# Patient Record
Sex: FEMALE | Race: WHITE
[De-identification: names, ages, dates, MRNs, and addresses within clinical notes are randomized per-mention and may not be internally consistent; named-entity substitution may affect disease eponyms.]

---

## 2017-10-09 ENCOUNTER — HOSPITAL ENCOUNTER (EMERGENCY)
Dept: HOSPITAL 11 - JP.ED | Age: 57
Discharge: HOME | End: 2017-10-09
Payer: MEDICAID

## 2017-10-09 VITALS — DIASTOLIC BLOOD PRESSURE: 83 MMHG | SYSTOLIC BLOOD PRESSURE: 149 MMHG

## 2017-10-09 DIAGNOSIS — E78.00: ICD-10-CM

## 2017-10-09 DIAGNOSIS — K52.9: Primary | ICD-10-CM

## 2017-10-09 DIAGNOSIS — Z90.49: ICD-10-CM

## 2017-10-09 DIAGNOSIS — F32.9: ICD-10-CM

## 2017-10-09 DIAGNOSIS — M19.90: ICD-10-CM

## 2017-10-09 DIAGNOSIS — Z79.899: ICD-10-CM

## 2017-10-09 DIAGNOSIS — E03.9: ICD-10-CM

## 2017-10-09 DIAGNOSIS — Z88.8: ICD-10-CM

## 2017-10-09 DIAGNOSIS — K21.9: ICD-10-CM

## 2017-10-09 DIAGNOSIS — J45.909: ICD-10-CM

## 2017-10-09 DIAGNOSIS — Z90.710: ICD-10-CM

## 2017-10-09 DIAGNOSIS — Z88.0: ICD-10-CM

## 2017-10-09 DIAGNOSIS — Z91.040: ICD-10-CM

## 2017-10-09 PROCEDURE — 80053 COMPREHEN METABOLIC PANEL: CPT

## 2017-10-09 PROCEDURE — 36415 COLL VENOUS BLD VENIPUNCTURE: CPT

## 2017-10-09 PROCEDURE — 96361 HYDRATE IV INFUSION ADD-ON: CPT

## 2017-10-09 PROCEDURE — 96374 THER/PROPH/DIAG INJ IV PUSH: CPT

## 2017-10-09 PROCEDURE — 83605 ASSAY OF LACTIC ACID: CPT

## 2017-10-09 PROCEDURE — 81001 URINALYSIS AUTO W/SCOPE: CPT

## 2017-10-09 PROCEDURE — 99284 EMERGENCY DEPT VISIT MOD MDM: CPT

## 2017-10-09 PROCEDURE — 84484 ASSAY OF TROPONIN QUANT: CPT

## 2017-10-09 PROCEDURE — 85025 COMPLETE CBC W/AUTO DIFF WBC: CPT

## 2017-10-09 PROCEDURE — 83690 ASSAY OF LIPASE: CPT

## 2017-10-09 NOTE — EDM.PDOC
ED HPI GENERAL MEDICAL PROBLEM





- General


Chief Complaint: Gastrointestinal Problem


Stated Complaint: FLU


Time Seen by Provider: 10/09/17 17:07


Source of Information: Reports: Patient, RN Notes Reviewed


History Limitations: Reports: No Limitations





- History of Present Illness


INITIAL COMMENTS - FREE TEXT/NARRATIVE: 





57-year-old female presents emergency department day complaint of nausea 

vomiting and diarrhea she's been ill for about 4 days denies any sick contacts 

no exposures food no fevers, still passing gas stool is mostly water no blood 

no formed stool has used Compazine for her nausea with some relief


  ** stomach


Pain Score (Numeric/FACES): 5





- Related Data


 Allergies











Allergy/AdvReac Type Severity Reaction Status Date / Time


 


latex Allergy  Numbness Verified 10/09/17 16:46


 


nitrofurantoin Allergy  Shortness Verified 10/09/17 16:46





[From Macrobid]   of Breath  


 


NSAIDS (Non-Steroidal Allergy  Rash Verified 10/09/17 16:46





Anti-Inflamma     


 


Penicillins Allergy  Rash Verified 10/09/17 16:46











Home Meds: 


 Home Meds





ALPRAZolam [Xanax] 1 mg PO DAILY 11/25/16 [History]


Estradiol 0.5 mg PO DAILY 11/25/16 [History]


Levothyroxine [Levothroid] 137 mcg PO DAILY 11/25/16 [History]


Omeprazole 40 mg PO BEDTIME 11/25/16 [History]


Sertraline [Zoloft] 100 mg PO DAILY 11/25/16 [History]


ARIPiprazole [Abilify] 5 mg PO DAILY 10/09/17 [History]


Prochlorperazine Maleate [Compazine] 10 mg PO Q8HR PRN 10/09/17 [History]











Past Medical History


HEENT History: Reports: Impaired Vision


Cardiovascular History: Reports: High Cholesterol


Respiratory History: Reports: Asthma


Gastrointestinal History: Reports: GERD


OB/GYN History: Reports: Other (See Below)


Other OB/BYN History: bilateral breaset reduction


Musculoskeletal History: Reports: Arthritis


Psychiatric History: Reports: Anxiety, Depression


Endocrine/Metabolic History: Reports: Hypothyroidism





- Infectious Disease History


Infectious Disease History: Reports: Chicken Pox





- Past Surgical History


GI Surgical History: Reports: Cholecystectomy, Colonoscopy, EGD


Female  Surgical History: Reports: Hysterectomy


Other Musculoskeletal Surgeries/Procedures:: connective tissue disease.  scope 

on bilateral knee for miniscus





Social & Family History





- Tobacco Use


Smoking Status *Q: Never Smoker





- Caffeine Use


Caffeine Use: Reports: Coffee, Soda





- Recreational Drug Use


Recreational Drug Use: No





ED ROS GENERAL





- Review of Systems


Review Of Systems: See Below


Constitutional: Reports: No Symptoms


HEENT: Reports: No Symptoms


Respiratory: Reports: No Symptoms


Cardiovascular: Reports: No Symptoms


GI/Abdominal: Reports: Abdominal Pain, Diarrhea, Flatus, Nausea, Vomiting


: Reports: No Symptoms


Musculoskeletal: Reports: No Symptoms


Skin: Reports: No Symptoms





ED EXAM, GI/ABD





- Physical Exam


Exam: See Below


Text/Narrative:: 





General: Female, not in any distress, alert and oriented x3 HEENT: head is 

atraumatic normocephalic, eyes pupils equal round reactive to light, sclera 

clear no conjunctivitis appreciated.  Ears tympanic membranes clear and gray 

landmarks and light reflex are present bilaterally canals are clear.  Nose no 

septal deviation, nares are clear, no blood present.  Mouth mucosa is moist and 

pink no erythema or exudate noted in soft palate, tongue is midline uvula is 

midline, dentition is intact.


Neck: Supple no thyromegaly no tracheal deviation.


Nodes: Cervical nodes subclavicular nodes nontender no palpable lymphadenopathy 

noted.


Lungs: clear to auscultation bilaterally with symmetrical respirations, no 

adventitious noise appreciated.


CV: Regular rate and rhythm S1 and S2 appreciated no murmurs rubs or gallops 

noted.


Abdomen: Soft, nontender, no palpable masses or organomegaly appreciated, no 

distention no guarding bowel sounds are present, .


Neuro: Cranial nerves II through XII grossly intact


Skin: Warm and dry, intact


Extremities: No lower extremity edema appreciated, 





Course





- Vital Signs


Last Recorded V/S: 


 Last Vital Signs











Temp  97.0 F   10/09/17 16:46


 


Pulse  83   10/09/17 16:46


 


Resp  16   10/09/17 16:46


 


BP  149/83 H  10/09/17 16:46


 


Pulse Ox  95   10/09/17 16:46














- Orders/Labs/Meds


Orders: 


 Active Orders 24 hr











 Category Date Time Status


 


 Peripheral IV Care [RC] .AS DIRECTED Care  10/09/17 17:14 Active


 


 Lactated Ringers [Ringers, Lactated] 1,000 ml Med  10/09/17 17:15 Active





 IV ASDIRECTED   


 


 Sodium Chloride 0.9% [Saline Flush] Med  10/09/17 17:14 Active





 10 ml FLUSH ASDIRECTED PRN   


 


 Peripheral IV Insertion Adult [OM.PC] Urgent Oth  10/09/17 17:14 Ordered








 Medication Orders





Lactated Ringer's (Ringers, Lactated)  1,000 mls @ 999 mls/hr IV ASDIRECTED JULIANO


   Last Admin: 10/09/17 17:37  Dose: 999 mls/hr


Sodium Chloride (Saline Flush)  10 ml FLUSH ASDIRECTED PRN


   PRN Reason: Keep Vein Open


   Last Admin: 10/09/17 17:33  Dose: 10 ml








Labs: 


 Laboratory Tests











  10/09/17 10/09/17 10/09/17 Range/Units





  17:26 17:26 17:26 


 


WBC  9.3    (4.5-11.0)  K/uL


 


RBC  4.12    (3.30-5.50)  M/uL


 


Hgb  11.2 L    (12.0-15.0)  g/dL


 


Hct  35.5 L    (36.0-48.0)  %


 


MCV  86    (80-98)  fL


 


MCH  27    (27-31)  pg


 


MCHC  32    (32-36)  %


 


Plt Count  300    (150-400)  K/uL


 


Neut % (Auto)  71 H    (36-66)  %


 


Lymph % (Auto)  22 L    (24-44)  %


 


Mono % (Auto)  6    (2-6)  %


 


Eos % (Auto)  1 L    (2-4)  %


 


Baso % (Auto)  0    (0-1)  %


 


Sodium   139 L   (140-148)  mmol/L


 


Potassium   3.5 L   (3.6-5.2)  mmol/L


 


Chloride   103   (100-108)  mmol/L


 


Carbon Dioxide   29   (21-32)  mmol/L


 


Anion Gap   10.5   (5.0-14.0)  mmol/L


 


BUN   13   (7-18)  mg/dL


 


Creatinine   0.8   (0.6-1.0)  mg/dL


 


Est Cr Clr Drug Dosing   65.73   mL/min


 


Estimated GFR (MDRD)   > 60   (>60)  


 


Glucose   108 H   ()  mg/dL


 


Lactic Acid    1.3  (0.4-2.0)  mmol/L


 


Calcium   7.9 L   (8.5-10.1)  mg/dL


 


Total Bilirubin   0.2   (0.2-1.0)  mg/dL


 


AST   53 H   (15-37)  U/L


 


ALT   68   (12-78)  U/L


 


Alkaline Phosphatase   148 H   ()  U/L


 


Troponin I   < 0.017   (0.000-0.056)  ng/mL


 


Total Protein   7.5   (6.4-8.2)  g/dL


 


Albumin   3.0 L   (3.4-5.0)  g/dL


 


Globulin   4.5 H   (2.3-3.5)  g/dL


 


Albumin/Globulin Ratio   0.7 L   (1.2-2.2)  


 


Lipase   77   ()  U/L


 


Urine Color     


 


Urine Appearance     


 


Urine pH     (4.5-8.0)  


 


Ur Specific Gravity     (1.008-1.030)  


 


Urine Protein     (NEGATIVE)  mg/dL


 


Urine Glucose (UA)     (NEGATIVE)  mg/dL


 


Urine Ketones     (NEGATIVE)  mg/dL


 


Urine Occult Blood     (NEGATIVE)  


 


Urine Nitrite     (NEGATIVE)  


 


Urine Bilirubin     (NEGATIVE)  


 


Urine Urobilinogen     (NORMAL)  mg/dL


 


Ur Leukocyte Esterase     (NEGATIVE)  


 


Urine RBC     (0-5)  


 


Urine WBC     (0-5)  


 


Ur Epithelial Cells     


 


Amorphous Sediment     


 


Urine Bacteria     


 


Urine Mucus     














  10/09/17 Range/Units





  17:43 


 


WBC   (4.5-11.0)  K/uL


 


RBC   (3.30-5.50)  M/uL


 


Hgb   (12.0-15.0)  g/dL


 


Hct   (36.0-48.0)  %


 


MCV   (80-98)  fL


 


MCH   (27-31)  pg


 


MCHC   (32-36)  %


 


Plt Count   (150-400)  K/uL


 


Neut % (Auto)   (36-66)  %


 


Lymph % (Auto)   (24-44)  %


 


Mono % (Auto)   (2-6)  %


 


Eos % (Auto)   (2-4)  %


 


Baso % (Auto)   (0-1)  %


 


Sodium   (140-148)  mmol/L


 


Potassium   (3.6-5.2)  mmol/L


 


Chloride   (100-108)  mmol/L


 


Carbon Dioxide   (21-32)  mmol/L


 


Anion Gap   (5.0-14.0)  mmol/L


 


BUN   (7-18)  mg/dL


 


Creatinine   (0.6-1.0)  mg/dL


 


Est Cr Clr Drug Dosing   mL/min


 


Estimated GFR (MDRD)   (>60)  


 


Glucose   ()  mg/dL


 


Lactic Acid   (0.4-2.0)  mmol/L


 


Calcium   (8.5-10.1)  mg/dL


 


Total Bilirubin   (0.2-1.0)  mg/dL


 


AST   (15-37)  U/L


 


ALT   (12-78)  U/L


 


Alkaline Phosphatase   ()  U/L


 


Troponin I   (0.000-0.056)  ng/mL


 


Total Protein   (6.4-8.2)  g/dL


 


Albumin   (3.4-5.0)  g/dL


 


Globulin   (2.3-3.5)  g/dL


 


Albumin/Globulin Ratio   (1.2-2.2)  


 


Lipase   ()  U/L


 


Urine Color  Yellow  


 


Urine Appearance  Cloudy  


 


Urine pH  6.0  (4.5-8.0)  


 


Ur Specific Gravity  1.010  (1.008-1.030)  


 


Urine Protein  Negative  (NEGATIVE)  mg/dL


 


Urine Glucose (UA)  Normal  (NEGATIVE)  mg/dL


 


Urine Ketones  Negative  (NEGATIVE)  mg/dL


 


Urine Occult Blood  Negative  (NEGATIVE)  


 


Urine Nitrite  Negative  (NEGATIVE)  


 


Urine Bilirubin  Negative  (NEGATIVE)  


 


Urine Urobilinogen  Normal  (NORMAL)  mg/dL


 


Ur Leukocyte Esterase  Negative  (NEGATIVE)  


 


Urine RBC  0-5  (0-5)  


 


Urine WBC  0-5  (0-5)  


 


Ur Epithelial Cells  Few  


 


Amorphous Sediment  Not seen  


 


Urine Bacteria  Moderate  


 


Urine Mucus  Not seen  











Meds: 


Medications











Generic Name Dose Route Start Last Admin





  Trade Name Freq  PRN Reason Stop Dose Admin


 


Lactated Ringer's  1,000 mls @ 999 mls/hr  10/09/17 17:15  10/09/17 17:37





  Ringers, Lactated  IV   999 mls/hr





  ASDIRECTED JULIANO   Administration


 


Sodium Chloride  10 ml  10/09/17 17:14  10/09/17 17:33





  Saline Flush  FLUSH   10 ml





  ASDIRECTED PRN   Administration





  Keep Vein Open   














Discontinued Medications














Generic Name Dose Route Start Last Admin





  Trade Name Freq  PRN Reason Stop Dose Admin


 


Ondansetron HCl  4 mg  10/09/17 17:15  10/09/17 17:33





  Zofran  IVPUSH  10/09/17 17:16  4 mg





  ONETIME ONE   Administration














Departure





- Departure


Time of Disposition: 18:27


Disposition: Home, Self-Care 01


Condition: Good


Clinical Impression: 


 Gastroenteritis








- Discharge Information


Referrals: 


Dexter Patel Sr, MD [Primary Care Provider] - 


Forms:  ED Department Discharge


Additional Instructions: 


Use Zofran as needed for nausea and vomiting symptoms, use Anaspaz as needed 

for cramping abdominal pain,  Please followup with your primary care provider 

in 3-5 days if not better, please call return to the emergency department with 

worsening of symptoms.





- My Orders


Last 24 Hours: 


My Active Orders





10/09/17 17:14


Peripheral IV Care [RC] .AS DIRECTED 


Sodium Chloride 0.9% [Saline Flush]   10 ml FLUSH ASDIRECTED PRN 


Peripheral IV Insertion Adult [OM.PC] Urgent 





10/09/17 17:15


Lactated Ringers [Ringers, Lactated] 1,000 ml IV ASDIRECTED 














- Assessment/Plan


Last 24 Hours: 


My Active Orders





10/09/17 17:14


Peripheral IV Care [RC] .AS DIRECTED 


Sodium Chloride 0.9% [Saline Flush]   10 ml FLUSH ASDIRECTED PRN 


Peripheral IV Insertion Adult [OM.PC] Urgent 





10/09/17 17:15


Lactated Ringers [Ringers, Lactated] 1,000 ml IV ASDIRECTED 











Plan: 





Assessment





Acuity = acute





Site and laterality = gastroenteritis  





Etiology  = unclear etiology





Manifestations = none





Location of injury =  Home





Lab values = CBC, CMP, troponin unremarkable





Plan


She had some improvement with Zofran provided as well as 1 L fluids plan is to 

discharge home with Zofran and Anaspaz for cramping abdominal discomfort follow-

up with primary care in 3-5 days if not better

















Patient was in agreement with the plan all questions were answered, they were 

instructed to return to the emergency department or call for worsening 

symptoms.  This note was dictated using dragon voice recognition software 

please call with any questions.

## 2017-11-08 ENCOUNTER — HOSPITAL ENCOUNTER (EMERGENCY)
Dept: HOSPITAL 11 - JP.ED | Age: 57
Discharge: HOME | End: 2017-11-08
Payer: MEDICAID

## 2017-11-08 VITALS — SYSTOLIC BLOOD PRESSURE: 122 MMHG | DIASTOLIC BLOOD PRESSURE: 63 MMHG

## 2017-11-08 DIAGNOSIS — J45.909: ICD-10-CM

## 2017-11-08 DIAGNOSIS — F32.9: ICD-10-CM

## 2017-11-08 DIAGNOSIS — Z88.8: ICD-10-CM

## 2017-11-08 DIAGNOSIS — R07.89: Primary | ICD-10-CM

## 2017-11-08 DIAGNOSIS — Z88.0: ICD-10-CM

## 2017-11-08 DIAGNOSIS — Z79.899: ICD-10-CM

## 2017-11-08 DIAGNOSIS — Z91.040: ICD-10-CM

## 2017-11-08 DIAGNOSIS — K21.9: ICD-10-CM

## 2017-11-08 DIAGNOSIS — E03.9: ICD-10-CM

## 2017-11-08 DIAGNOSIS — E78.00: ICD-10-CM

## 2017-11-08 PROCEDURE — 96361 HYDRATE IV INFUSION ADD-ON: CPT

## 2017-11-08 PROCEDURE — 83690 ASSAY OF LIPASE: CPT

## 2017-11-08 PROCEDURE — 36415 COLL VENOUS BLD VENIPUNCTURE: CPT

## 2017-11-08 PROCEDURE — 85379 FIBRIN DEGRADATION QUANT: CPT

## 2017-11-08 PROCEDURE — 85025 COMPLETE CBC W/AUTO DIFF WBC: CPT

## 2017-11-08 PROCEDURE — 82553 CREATINE MB FRACTION: CPT

## 2017-11-08 PROCEDURE — 71020: CPT

## 2017-11-08 PROCEDURE — 93005 ELECTROCARDIOGRAM TRACING: CPT

## 2017-11-08 PROCEDURE — 84484 ASSAY OF TROPONIN QUANT: CPT

## 2017-11-08 PROCEDURE — 71260 CT THORAX DX C+: CPT

## 2017-11-08 PROCEDURE — 96374 THER/PROPH/DIAG INJ IV PUSH: CPT

## 2017-11-08 PROCEDURE — 99285 EMERGENCY DEPT VISIT HI MDM: CPT

## 2017-11-08 PROCEDURE — 80053 COMPREHEN METABOLIC PANEL: CPT

## 2017-11-08 PROCEDURE — 96376 TX/PRO/DX INJ SAME DRUG ADON: CPT

## 2017-11-08 NOTE — EDM.PDOC
ED HPI GENERAL MEDICAL PROBLEM





- General


Chief Complaint: Chest Pain


Stated Complaint: CHEST PAIN


Time Seen by Provider: 11/08/17 15:59


Source of Information: Reports: Patient, RN Notes Reviewed


History Limitations: Reports: No Limitations





- History of Present Illness


INITIAL COMMENTS - FREE TEXT/NARRATIVE: 


57-year-old female presents emergency department day complaint of chest pain 

and shortness of breath she states started about 6 hours ago it is worse with 

exertion she has no known cardiac history did present to her clinic at which an 

EKG was done was told by her provider that it was negative and recommend she 

follow-up in the emergency department. She has no radiation of the pain no 

diaphoresis no nausea vomiting





  ** Middle Chest


Pain Score (Numeric/FACES): 7





  ** Headache


Pain Score (Numeric/FACES): 5





- Related Data


 Allergies











Allergy/AdvReac Type Severity Reaction Status Date / Time


 


latex Allergy  Numbness Verified 11/08/17 15:45


 


nitrofurantoin Allergy  Shortness Verified 11/08/17 15:45





[From Macrobid]   of Breath  


 


NSAIDS (Non-Steroidal Allergy  Rash Verified 11/08/17 15:45





Anti-Inflamma     


 


Penicillins Allergy  Rash Verified 11/08/17 15:45











Home Meds: 


 Home Meds





ALPRAZolam [Xanax] 1 mg PO BEDTIME 11/25/16 [History]


Estradiol 0.5 mg PO DAILY 11/25/16 [History]


Levothyroxine [Levothroid] 137 mcg PO DAILY 11/25/16 [History]


Omeprazole 40 mg PO BEDTIME 11/25/16 [History]


Sertraline [Zoloft] 200 mg PO BEDTIME 11/25/16 [History]


ARIPiprazole [Abilify] 5 mg PO BEDTIME 10/09/17 [History]


Prochlorperazine Maleate [Compazine] 10 mg PO Q8HR PRN 10/09/17 [History]











Past Medical History


HEENT History: Reports: Impaired Vision


Cardiovascular History: Reports: High Cholesterol


Respiratory History: Reports: Asthma


Gastrointestinal History: Reports: GERD


OB/GYN History: Reports: Other (See Below)


Other OB/BYN History: bilateral breaset reduction


Musculoskeletal History: Reports: Arthritis


Psychiatric History: Reports: Anxiety, Depression


Endocrine/Metabolic History: Reports: Hypothyroidism





- Infectious Disease History


Infectious Disease History: Reports: Chicken Pox





- Past Surgical History


GI Surgical History: Reports: Cholecystectomy, Colonoscopy, EGD


Female  Surgical History: Reports: Hysterectomy


Musculoskeletal Surgical History: Reports: Other (See Below)


Other Musculoskeletal Surgeries/Procedures:: connective tissue disease.  scope 

on bilateral knee for miniscus





Social & Family History





- Tobacco Use


Smoking Status *Q: Never Smoker


Second Hand Smoke Exposure: No





- Caffeine Use


Caffeine Use: Reports: Soda





- Recreational Drug Use


Recreational Drug Use: No





ED ROS GENERAL





- Review of Systems


Review Of Systems: See Below


Constitutional: Reports: No Symptoms


HEENT: Reports: No Symptoms


Respiratory: Reports: Shortness of Breath


Cardiovascular: Reports: Chest Pain, Dyspnea on Exertion


GI/Abdominal: Reports: No Symptoms


: Reports: No Symptoms


Musculoskeletal: Reports: No Symptoms


Skin: Reports: No Symptoms


Neurological: Reports: No Symptoms





ED EXAM, GENERAL





- Physical Exam


Exam: See Below


Free Text/Narrative:: 





General: Female, not in any distress, alert and oriented x3 HEENT: head is 

atraumatic normocephalic, eyes pupils equal round reactive to light, sclera 

clear no conjunctivitis appreciated.  Ears tympanic membranes clear and gray 

landmarks and light reflex are present bilaterally canals are clear.  Nose no 

septal deviation, nares are clear, no blood present.  Mouth mucosa is moist and 

pink no erythema or exudate noted in soft palate, tongue is midline uvula is 

midline, dentition is intact.


Neck: Supple no thyromegaly no tracheal deviation.


Nodes: Cervical nodes subclavicular nodes nontender no palpable lymphadenopathy 

noted.


Lungs: clear to auscultation bilaterally with symmetrical respirations, no 

adventitious noise appreciated.


CV: Regular rate and rhythm S1 and S2 appreciated no murmurs rubs or gallops 

noted.


Abdomen: Soft, nontender, no palpable masses or organomegaly appreciated, no 

distention no guarding bowel sounds are present,  .


Neuro: Cranial nerves II through XII grossly intact


Skin: Warm and dry, intact


Extremities: No lower extremity edema appreciated,  





Course





- Vital Signs


Last Recorded V/S: 


 Last Vital Signs











Temp  96.3 F   11/08/17 16:44


 


Pulse  69   11/08/17 16:44


 


Resp  16   11/08/17 18:30


 


BP  122/63   11/08/17 18:30


 


Pulse Ox  96   11/08/17 18:30














- Orders/Labs/Meds


Orders: 


 Active Orders 24 hr











 Category Date Time Status


 


 Cardiac Monitoring [RC] .As Directed Care  11/08/17 16:06 Active


 


 EKG Documentation Completion [RC] ASDIRECTED Care  11/08/17 16:08 Active


 


 Chest 2V [CR] Stat Exams  11/08/17 16:07 Taken


 


 Chest w Cont [CT] Stat Exams  11/08/17 17:30 Taken


 


 Iopamidol [Isovue-300 (61%)] Med  11/08/17 18:00 Active





 100 ml IV .AS DIRECTED   


 


 Sodium Chloride 0.9% [Normal Saline] 1,000 ml Med  11/08/17 17:30 Active





 IV ASDIRECTED   


 


 Sodium Chloride 0.9% [Normal Saline] 100 ml Med  11/08/17 18:00 Active





 IV ASDIRECTED   


 


 ED Pain Medications Reflex [OM.PC] Stat Oth  11/08/17 16:07 Ordered


 


 EKG 12 Lead [EK] Stat Ther  11/08/17 16:07 Ordered








 Medication Orders





Sodium Chloride (Normal Saline)  1,000 mls @ 500 mls/hr IV ASDIRECTED [facility]


   Last Admin: 11/08/17 17:40  Dose: 500 mls/hr


Sodium Chloride (Normal Saline)  100 mls @ 3 mls/sec IV ASDIRECTED [facility]


   Last Admin: 11/08/17 18:07  Dose: 3 mls/sec


Iopamidol (Isovue-300 (61%))  100 ml IV .AS DIRECTED [facility]


   Last Admin: 11/08/17 18:07  Dose: 100 ml








Labs: 


 Laboratory Tests











  11/08/17 11/08/17 11/08/17 Range/Units





  16:19 16:19 16:19 


 


WBC  9.4    (4.5-11.0)  K/uL


 


RBC  3.93    (3.30-5.50)  M/uL


 


Hgb  10.8 L    (12.0-15.0)  g/dL


 


Hct  33.9 L    (36.0-48.0)  %


 


MCV  86    (80-98)  fL


 


MCH  28    (27-31)  pg


 


MCHC  32    (32-36)  %


 


Plt Count  275    (150-400)  K/uL


 


Neut % (Auto)  66    (36-66)  %


 


Lymph % (Auto)  28    (24-44)  %


 


Mono % (Auto)  5    (2-6)  %


 


Eos % (Auto)  1 L    (2-4)  %


 


Baso % (Auto)  0    (0-1)  %


 


D-Dimer, Quantitative   < 100   (0.0-400.0)  ng/mL


 


Sodium    140  (140-148)  mmol/L


 


Potassium    4.2  (3.6-5.2)  mmol/L


 


Chloride    103  (100-108)  mmol/L


 


Carbon Dioxide    31  (21-32)  mmol/L


 


Anion Gap    6.2  (5.0-14.0)  mmol/L


 


BUN    12  (7-18)  mg/dL


 


Creatinine    0.8  (0.6-1.0)  mg/dL


 


Est Cr Clr Drug Dosing    65.73  mL/min


 


Estimated GFR (MDRD)    > 60  (>60)  


 


Glucose    91  ()  mg/dL


 


Calcium    8.7  (8.5-10.1)  mg/dL


 


Total Bilirubin    0.2  (0.2-1.0)  mg/dL


 


AST    29  (15-37)  U/L


 


ALT    41  (12-78)  U/L


 


Alkaline Phosphatase    132 H  ()  U/L


 


CK-MB (CK-2)    0.3  (0-3.6)  mg/mL


 


Troponin I    < 0.017  (0.000-0.056)  ng/mL


 


Total Protein    7.3  (6.4-8.2)  g/dL


 


Albumin    3.0 L  (3.4-5.0)  g/dL


 


Globulin    4.3 H  (2.3-3.5)  g/dL


 


Albumin/Globulin Ratio    0.7 L  (1.2-2.2)  


 


Lipase    80  ()  U/L











Meds: 


Medications











Generic Name Dose Route Start Last Admin





  Trade Name Freq  PRN Reason Stop Dose Admin


 


Sodium Chloride  1,000 mls @ 500 mls/hr  11/08/17 17:30  11/08/17 17:40





  Normal Saline  IV   500 mls/hr





  ASDIRECTED JULIANO   Administration


 


Sodium Chloride  100 mls @ 3 mls/sec  11/08/17 18:00  11/08/17 18:07





  Normal Saline  IV   3 mls/sec





  ASDIRECTED JULIANO   Administration


 


Iopamidol  100 ml  11/08/17 18:00  11/08/17 18:07





  Isovue-300 (61%)  IV   100 ml





  .AS DIRECTED JULIANO   Administration














Discontinued Medications














Generic Name Dose Route Start Last Admin





  Trade Name Freq  PRN Reason Stop Dose Admin


 


Clopidogrel Bisulfate  300 mg  11/08/17 16:08  11/08/17 16:25





  Plavix  PO  11/08/17 16:09  300 mg





  ONETIME ONE   Administration


 


Al Hydroxide/Mg Hydroxide 15  0 ml  11/08/17 17:06  11/08/17 17:13





  ml/ Lidocaine HCl 15 ml  PO  11/08/17 17:07  15 ml





  ONETIME ONE   Administration


 


Morphine Sulfate  2 mg  11/08/17 16:06  11/08/17 16:35





  Morphine  IVPUSH  11/08/17 16:07  2 mg





  ONETIME ONE   Administration


 


Morphine Sulfate  2 mg  11/08/17 17:32  11/08/17 17:41





  Morphine  IVPUSH  11/08/17 17:33  2 mg





  ONETIME ONE   Administration














Departure





- Departure


Time of Disposition: 18:50


Disposition: Home, Self-Care 01


Condition: Good


Clinical Impression: 


 Atypical chest pain





Referrals: 


PCP,None [Primary Care Provider] - 


Forms:  ED Department Discharge


Additional Instructions: 


use ibuprofen or Tylenol as needed for pain control please follow-up with your 

primary care in 3-5 days for further evaluation if not better, call or return 

to the emergency department worsening of symptoms





- My Orders


Last 24 Hours: 


My Active Orders





11/08/17 16:06


Cardiac Monitoring [RC] .As Directed 





11/08/17 16:07


Chest 2V [CR] Stat 


ED Pain Medications Reflex [OM.PC] Stat 


EKG 12 Lead [EK] Stat 





11/08/17 16:08


EKG Documentation Completion [RC] ASDIRECTED 





11/08/17 17:30


Chest w Cont [CT] Stat 


Sodium Chloride 0.9% [Normal Saline] 1,000 ml IV ASDIRECTED 





11/08/17 18:00


Iopamidol [Isovue-300 (61%)]   100 ml IV .AS DIRECTED 


Sodium Chloride 0.9% [Normal Saline] 100 ml IV ASDIRECTED 














- Assessment/Plan


Last 24 Hours: 


My Active Orders





11/08/17 16:06


Cardiac Monitoring [RC] .As Directed 





11/08/17 16:07


Chest 2V [CR] Stat 


ED Pain Medications Reflex [OM.PC] Stat 


EKG 12 Lead [EK] Stat 





11/08/17 16:08


EKG Documentation Completion [RC] ASDIRECTED 





11/08/17 17:30


Chest w Cont [CT] Stat 


Sodium Chloride 0.9% [Normal Saline] 1,000 ml IV ASDIRECTED 





11/08/17 18:00


Iopamidol [Isovue-300 (61%)]   100 ml IV .AS DIRECTED 


Sodium Chloride 0.9% [Normal Saline] 100 ml IV ASDIRECTED 











Plan: 





Assessment





Acuity = acute





Site and laterality = atypical chest pain  





Etiology  = unclear etiology





Manifestations = none





Location of injury =  Home





Lab values = CBC, CMP, d-dimer, troponin all negative CT scan of chest shows no 

acute process





Plan


I did review lab work and image studies with her she received relief from 

morphine no relief from the GI cocktail she is going to use Tylenol as needed 

for pain control follow up with primary care for further evaluation 3-5 days

















Patient was in agreement with the plan all questions were answered, they were 

instructed to return to the emergency department or call for worsening 

symptoms.  This note was dictated using dragon voice recognition software 

please call with any questions.

## 2017-11-09 NOTE — CR
Chest 2V

 

INDICATION: Chest Pain 

 

FINDINGS: Heart size at the upper limits of normal. Lungs are clear. Hypertrophic changes thoracic sp
ine. Exam otherwise unremarkable.

## 2018-03-30 ENCOUNTER — HOSPITAL ENCOUNTER (EMERGENCY)
Dept: HOSPITAL 11 - JP.ED | Age: 58
LOS: 1 days | Discharge: HOME | End: 2018-03-31
Payer: MEDICAID

## 2018-03-30 VITALS — DIASTOLIC BLOOD PRESSURE: 71 MMHG | SYSTOLIC BLOOD PRESSURE: 141 MMHG

## 2018-03-30 DIAGNOSIS — Z88.0: ICD-10-CM

## 2018-03-30 DIAGNOSIS — Z91.040: ICD-10-CM

## 2018-03-30 DIAGNOSIS — Z88.8: ICD-10-CM

## 2018-03-30 DIAGNOSIS — G50.0: Primary | ICD-10-CM

## 2018-03-30 PROCEDURE — 96375 TX/PRO/DX INJ NEW DRUG ADDON: CPT

## 2018-03-30 PROCEDURE — 99284 EMERGENCY DEPT VISIT MOD MDM: CPT

## 2018-03-30 PROCEDURE — 96374 THER/PROPH/DIAG INJ IV PUSH: CPT

## 2018-03-30 NOTE — EDM.PDOC
ED HPI GENERAL MEDICAL PROBLEM





- General


Chief Complaint: Neurological Problem


Stated Complaint: ILLNESS


Time Seen by Provider: 03/30/18 22:45


Source of Information: Reports: Patient, Family


History Limitations: Reports: No Limitations





- History of Present Illness


INITIAL COMMENTS - FREE TEXT/NARRATIVE: 





57-year-old female with a history of trigeminal neuralgia fortunately has had 

no symptoms for the past 5 years, but over the past 4-6 hours symptoms have 

recurred on the left side and she is very uncomfortable. She's had no recent 

illness, trauma, fevers or chills, nausea or vomiting or other reason for the 

acute recurrence of symptoms. It's a recurring shocklike pain occurring from 

the front of the ear on the left side into the upper and lower jaw. There is a 

baseline pain between shocks that is present as well. It was treated 

successfully with IV ketamine with her past exacerbations.


Duration: Hour(s): (5-6 hours)


Location: Reports: Head, Face (Left side)


Severity: Moderate


Associated Symptoms: Reports: No Other Symptoms


  ** Left Face


Pain Score (Numeric/FACES): 10





- Related Data


 Allergies











Allergy/AdvReac Type Severity Reaction Status Date / Time


 


latex Allergy  Numbness Verified 03/30/18 22:24


 


nitrofurantoin Allergy  Shortness Verified 03/30/18 22:24





[From Macrobid]   of Breath  


 


NSAIDS (Non-Steroidal Allergy  Rash Verified 03/30/18 22:24





Anti-Inflamma     


 


Penicillins Allergy  Rash Verified 03/30/18 22:24











Home Meds: 


 Home Meds





ALPRAZolam [Xanax] 1 mg PO BEDTIME 11/25/16 [History]


Omeprazole 40 mg PO BEDTIME 11/25/16 [History]


Sertraline [Zoloft] 200 mg PO BEDTIME 11/25/16 [History]


ARIPiprazole [Abilify] 5 mg PO BEDTIME 10/09/17 [History]


Prochlorperazine Maleate [Compazine] 10 mg PO Q8HR PRN 10/09/17 [History]











Past Medical History


HEENT History: Reports: Impaired Vision


Cardiovascular History: Reports: High Cholesterol


Respiratory History: Reports: Asthma


Gastrointestinal History: Reports: GERD


OB/GYN History: Reports: Pregnancy, Other (See Below)


Other OB/BYN History: bilateral breaset reduction


Musculoskeletal History: Reports: Arthritis


Neurological History: Reports: Concussion


Psychiatric History: Reports: Anxiety, Depression, Panic Attack, Psych 

Hospitalization(s), Suicide Attempt, Suicidal Ideation


Endocrine/Metabolic History: Reports: Hypothyroidism


Hematologic History: Reports: Anemia





- Infectious Disease History


Infectious Disease History: Reports: Chicken Pox, Helicobacter Pylori, Mumps





- Past Surgical History


GI Surgical History: Reports: Cholecystectomy, Colonoscopy, EGD, Polypectomy


Female  Surgical History: Reports: Hysterectomy, Oophorectomy


Musculoskeletal Surgical History: Reports: Other (See Below)


Other Musculoskeletal Surgeries/Procedures:: connective tissue disease.  scope 

on bilateral knee for miniscus





Social & Family History





- Tobacco Use


Smoking Status *Q: Never Smoker


Second Hand Smoke Exposure: No





- Caffeine Use


Caffeine Use: Reports: Soda





- Recreational Drug Use


Recreational Drug Use: No





ED ROS GENERAL





- Review of Systems


Review Of Systems: See Below


Constitutional: Reports: Malaise.  Denies: Fever, Chills


Respiratory: Denies: Shortness of Breath, Cough


Cardiovascular: Denies: Chest Pain


GI/Abdominal: Denies: Abdominal Pain, Nausea, Vomiting


: Reports: No Symptoms


Skin: Denies: Rash, Erythema


Neurological: Reports: Headache


Psychiatric: Reports: No Symptoms





ED EXAM, NEURO





- Physical Exam


Exam: See Below


Exam Limited By: No Limitations


General Appearance: Alert, No Apparent Distress (Patient looks uncomfortable 

but not in any distress)


Ears: Normal External Exam


Throat/Mouth: Normal Inspection


Head Exam: Atraumatic, Normocephalic


Neck: Supple


Respiratory/Chest: No Respiratory Distress


Cardiovascular: Regular Rate, Rhythm





Course





- Vital Signs


Last Recorded V/S: 


 Last Vital Signs











Temp  96.6 F   03/30/18 22:38


 


Pulse  72   03/30/18 23:53


 


Resp  15   03/30/18 23:53


 


BP  141/71 H  03/30/18 23:53


 


Pulse Ox  97   03/30/18 23:53














- Orders/Labs/Meds


Meds: 


Medications














Discontinued Medications














Generic Name Dose Route Start Last Admin





  Trade Name Nazarioq  PRN Reason Stop Dose Admin


 


Ketamine HCl  50 mg  03/30/18 22:59  03/30/18 23:22





  Ketalar  IV  03/30/18 23:00  50 mg





  ONETIME ONE   Administration





     





     





     





     


 


Methylprednisolone Sodium Succinate  125 mg  03/30/18 23:00  03/30/18 23:22





  Solu-Medrol  IVPUSH  03/30/18 23:01  125 mg





  ONETIME ONE   Administration





     





     





     





     














- Re-Assessments/Exams


Free Text/Narrative Re-Assessment/Exam: 





03/30/18 23:52


An IV was started, the patient was given 50 mg of IV ketamine along with 125 mg 

of Solu-Medrol. Within 20 minutes she had marked improvement. She'll be 

discharged with 10 doses of Vicodin to use on a when necessary basis over the 

next few days and can return for another IV dose of ketamine if symptoms are 

persisting in 24 hours.





Departure





- Departure


Time of Disposition: 00:03


Disposition: Home, Self-Care 01


Condition: Good


Clinical Impression: 


 Trigeminal neuralgia of left side of face








- Discharge Information


Instructions:  Trigeminal Neuralgia


Referrals: 


Dexter Patel Sr, MD [Primary Care Provider] - 


Forms:  ED Department Discharge


Care Plan Goals: 


Rest, pain medication as directed, and return tomorrow evening if another dose 

of ketamine is needed. Return sooner if worsening or concerns.

## 2018-03-31 ENCOUNTER — HOSPITAL ENCOUNTER (EMERGENCY)
Dept: HOSPITAL 11 - JP.ED | Age: 58
Discharge: HOME | End: 2018-03-31
Payer: MEDICAID

## 2018-03-31 VITALS — SYSTOLIC BLOOD PRESSURE: 142 MMHG | DIASTOLIC BLOOD PRESSURE: 75 MMHG

## 2018-03-31 DIAGNOSIS — Z88.0: ICD-10-CM

## 2018-03-31 DIAGNOSIS — G50.0: Primary | ICD-10-CM

## 2018-03-31 DIAGNOSIS — Z88.8: ICD-10-CM

## 2018-03-31 DIAGNOSIS — Z91.040: ICD-10-CM

## 2018-03-31 DIAGNOSIS — Z79.899: ICD-10-CM

## 2018-03-31 PROCEDURE — 99284 EMERGENCY DEPT VISIT MOD MDM: CPT

## 2018-03-31 PROCEDURE — 96375 TX/PRO/DX INJ NEW DRUG ADDON: CPT

## 2018-03-31 PROCEDURE — 96374 THER/PROPH/DIAG INJ IV PUSH: CPT

## 2018-03-31 NOTE — EDM.PDOC
ED HPI GENERAL MEDICAL PROBLEM





- General


Chief Complaint: Neurological Problem


Stated Complaint: FACIAL PAIN


Time Seen by Provider: 03/31/18 20:15


Source of Information: Reports: Patient, Family


History Limitations: Reports: No Limitations





- History of Present Illness


INITIAL COMMENTS - FREE TEXT/NARRATIVE: 





57-year-old female who is having a recurrence of left trigeminal neuralgia his 

back for retreatment. Last evening he gave her an IV dose of ketamine along 

with Solu-Medrol, she had marked improvement but this afternoon symptoms 

started recurring and we discussed if they were not resolved she should come in 

for a second treatment. No fevers or chills. No visual disturbance. No nausea 

or vomiting.


Location: Reports: Head, Face


Severity: Mild


  ** Left Face


Pain Score (Numeric/FACES): 7





- Related Data


 Allergies











Allergy/AdvReac Type Severity Reaction Status Date / Time


 


latex Allergy  Numbness Verified 03/31/18 19:58


 


nitrofurantoin Allergy  Shortness Verified 03/31/18 19:58





[From Macrobid]   of Breath  


 


NSAIDS (Non-Steroidal Allergy  Rash Verified 03/31/18 19:58





Anti-Inflamma     


 


Penicillins Allergy  Rash Verified 03/31/18 19:58











Home Meds: 


 Home Meds





ALPRAZolam [Xanax] 1 mg PO BEDTIME 11/25/16 [History]


Omeprazole 40 mg PO BEDTIME 11/25/16 [History]


Sertraline [Zoloft] 200 mg PO BEDTIME 11/25/16 [History]


ARIPiprazole [Abilify] 5 mg PO BEDTIME 10/09/17 [History]


Prochlorperazine Maleate [Compazine] 10 mg PO Q8HR PRN 10/09/17 [History]











Past Medical History


HEENT History: Reports: Impaired Vision


Cardiovascular History: Reports: High Cholesterol


Respiratory History: Reports: Asthma


Gastrointestinal History: Reports: GERD


OB/GYN History: Reports: Pregnancy, Other (See Below)


Other OB/BYN History: bilateral breaset reduction


Musculoskeletal History: Reports: Arthritis


Neurological History: Reports: Concussion


Psychiatric History: Reports: Anxiety, Depression, Panic Attack, Psych 

Hospitalization(s), Suicide Attempt, Suicidal Ideation


Endocrine/Metabolic History: Reports: Hypothyroidism


Hematologic History: Reports: Anemia





- Infectious Disease History


Infectious Disease History: Reports: Chicken Pox, Helicobacter Pylori, Mumps





- Past Surgical History


GI Surgical History: Reports: Cholecystectomy, Colonoscopy, EGD, Polypectomy


Female  Surgical History: Reports: Hysterectomy, Oophorectomy


Musculoskeletal Surgical History: Reports: Other (See Below)


Other Musculoskeletal Surgeries/Procedures:: connective tissue disease.  scope 

on bilateral knee for miniscus





Social & Family History





- Tobacco Use


Smoking Status *Q: Never Smoker


Second Hand Smoke Exposure: No





- Caffeine Use


Caffeine Use: Reports: Soda





- Recreational Drug Use


Recreational Drug Use: No





ED ROS GENERAL





- Review of Systems


Review Of Systems: See Below


Constitutional: Denies: Fever


HEENT: Denies: Dental Pain


Respiratory: Denies: Shortness of Breath


Cardiovascular: Denies: Chest Pain


GI/Abdominal: Denies: Nausea, Vomiting


Psychiatric: Reports: No Symptoms





ED EXAM, NEURO





- Physical Exam


Exam: See Below


Exam Limited By: No Limitations


General Appearance: Alert, No Apparent Distress


Head Exam: Atraumatic, Other (I did not reproduce the trigeminal neuralgia with 

palpation of the temporal area)


Respiratory/Chest: No Respiratory Distress





Course





- Vital Signs


Last Recorded V/S: 


 Last Vital Signs











Temp  98.1 F   03/31/18 20:04


 


Pulse  71   03/31/18 20:55


 


Resp  14   03/31/18 20:55


 


BP  142/75 H  03/31/18 20:55


 


Pulse Ox  95   03/31/18 20:55














- Orders/Labs/Meds


Meds: 


Medications














Discontinued Medications














Generic Name Dose Route Start Last Admin





  Trade Name Cindy  PRN Reason Stop Dose Admin


 


Ketamine HCl  50 mg  03/31/18 20:18  03/31/18 20:28





  Ketalar  IV  03/31/18 20:19  50 mg





  ONETIME ONE   Administration





     





     





     





     


 


Methylprednisolone Sodium Succinate  62.5 mg  03/31/18 20:18  03/31/18 20:28





  Solu-Medrol  IVPUSH  03/31/18 20:19  62.5 mg





  ONETIME ONE   Administration





     





     





     





     














- Re-Assessments/Exams


Free Text/Narrative Re-Assessment/Exam: 





03/31/18 20:33


An IV was started, patient was given 50 mg of IV ketamine along with 62.5 mg of 

Solu-Medrol. Hopefully she will continue to improve, she can return if 

worsening.





Departure





- Departure


Time of Disposition: 20:55


Disposition: Home, Self-Care 01


Condition: Good


Clinical Impression: 


 Trigeminal neuralgia of left side of face








- Discharge Information


Instructions:  Trigeminal Neuralgia


Referrals: 


Dexter Patel Sr, MD [Primary Care Provider] - 


Forms:  ED Department Discharge


Care Plan Goals: 


Continue your regular medications, recheck in 2-3 days if not improving 

satisfactorily.

## 2018-04-09 ENCOUNTER — HOSPITAL ENCOUNTER (EMERGENCY)
Dept: HOSPITAL 11 - JP.ED | Age: 58
Discharge: HOME | End: 2018-04-09
Payer: MEDICAID

## 2018-04-09 VITALS — SYSTOLIC BLOOD PRESSURE: 117 MMHG | DIASTOLIC BLOOD PRESSURE: 55 MMHG

## 2018-04-09 DIAGNOSIS — G50.0: Primary | ICD-10-CM

## 2018-04-09 DIAGNOSIS — Z88.8: ICD-10-CM

## 2018-04-09 DIAGNOSIS — Z91.040: ICD-10-CM

## 2018-04-09 DIAGNOSIS — Z88.0: ICD-10-CM

## 2018-04-09 PROCEDURE — 99283 EMERGENCY DEPT VISIT LOW MDM: CPT

## 2018-04-09 PROCEDURE — 96361 HYDRATE IV INFUSION ADD-ON: CPT

## 2018-04-09 PROCEDURE — 96375 TX/PRO/DX INJ NEW DRUG ADDON: CPT

## 2018-04-09 PROCEDURE — 96374 THER/PROPH/DIAG INJ IV PUSH: CPT

## 2018-04-09 RX ADMIN — Medication PRN ML: at 04:22

## 2018-04-09 RX ADMIN — Medication PRN ML: at 04:35

## 2018-04-09 NOTE — EDM.PDOC
ED HPI GENERAL MEDICAL PROBLEM





- General


Chief Complaint: ENT Problem


Stated Complaint: FACE PAIN


Time Seen by Provider: 18 03:51


Source of Information: Reports: Patient


History Limitations: Reports: No Limitations





- History of Present Illness


INITIAL COMMENTS - FREE TEXT/NARRATIVE: 





pt arrived with severe left facial pain. She has a known diagnosis of 

trigeminal neuralgia.  She was on tegretol but this was affecting her zoloft 

and she stopped this about 5 years ago, She did not have any further problems 

until in March when she startd having recurrent pain. 


Onset: Sudden


Duration: Hour(s):


Location: Reports: Face


Associated Symptoms: Reports: No Other Symptoms


  ** left jaw


Pain Score (Numeric/FACES): 7





- Related Data


 Allergies











Allergy/AdvReac Type Severity Reaction Status Date / Time


 


latex Allergy  Numbness Verified 18 03:36


 


nitrofurantoin Allergy  Shortness Verified 18 03:36





[From Macrobid]   of Breath  


 


NSAIDS (Non-Steroidal Allergy  Rash Verified 18 03:36





Anti-Inflamma     


 


Penicillins Allergy  Rash Verified 18 03:36











Home Meds: 


 Home Meds





ALPRAZolam [Xanax] 1 mg PO BEDTIME 16 [History]


Omeprazole 40 mg PO BEDTIME 16 [History]


Sertraline [Zoloft] 200 mg PO BEDTIME 16 [History]


ARIPiprazole [Abilify] 5 mg PO BEDTIME 10/09/17 [History]


Prochlorperazine Maleate [Compazine] 10 mg PO Q8HR PRN 10/09/17 [History]











Past Medical History


HEENT History: Reports: Impaired Vision


Cardiovascular History: Reports: High Cholesterol


Respiratory History: Reports: Asthma


Gastrointestinal History: Reports: GERD


OB/GYN History: Reports: Pregnancy, Other (See Below)


Other OB/BYN History: bilateral breaset reduction


Musculoskeletal History: Reports: Arthritis


Neurological History: Reports: Concussion, Other (See Below)


Other Neuro History: Trijeminal neuralgia


Psychiatric History: Reports: Anxiety, Depression, Panic Attack, Psych 

Hospitalization(s), Suicide Attempt, Suicidal Ideation


Endocrine/Metabolic History: Reports: Hypothyroidism


Hematologic History: Reports: Anemia





- Infectious Disease History


Infectious Disease History: Reports: Chicken Pox, Mumps





- Past Surgical History


GI Surgical History: Reports: Cholecystectomy, Colonoscopy, EGD, Polypectomy


Female  Surgical History: Reports: Breast Reduction,  Section, 

Hysterectomy, Oophorectomy


Musculoskeletal Surgical History: Reports: Other (See Below)


Other Musculoskeletal Surgeries/Procedures:: connective tissue disease.  scope 

on bilateral knee for miniscus





Social & Family History





- Tobacco Use


Smoking Status *Q: Never Smoker


Second Hand Smoke Exposure: No





- Caffeine Use


Caffeine Use: Reports: Soda





- Recreational Drug Use


Recreational Drug Use: No





ED ROS ENT





- Review of Systems


Review Of Systems: See Below


Constitutional: Reports: No Symptoms


HEENT: Reports: Other ( evere left margaret facial pain)


Respiratory: Reports: No Symptoms


Cardiovascular: Reports: No Symptoms


Endocrine: Reports: No Symptoms


GI/Abdominal: Reports: No Symptoms


: Reports: No Symptoms


Musculoskeletal: Reports: No Symptoms


Skin: Reports: No Symptoms





ED EXAM, ENT





- Physical Exam


Exam: See Below


Text/Narrative:: 





pt has a known diagnosis of trigeminal neuralgia and now has recurrent pain. 

She has not been able to take the tegretol she was on previously because she 

was having some problem with her it interfering with her zoloft. 


Exam Limited By: No Limitations


General Appearance: Alert


Ears: Normal TMs


Nose: Normal Inspection


Mouth/Throat: Normal Inspection


Head: Atraumatic


Neck: Normal Inspection


Respiratory/Chest: No Respiratory Distress


Cardiovascular: Regular Rate, Rhythm


GI/Abdominal: Soft, Non-Tender


 (Female) Exam: Deferred


Rectal (Female) Exam: Deferred


Back: Normal Inspection


Extremities: Normal Inspection


Neurological: Alert





Course





- Vital Signs


Last Recorded V/S: 


 Last Vital Signs











Temp  36.1 C   18 04:44


 


Pulse  67   18 05:10


 


Resp  13   18 05:10


 


BP  113/54 L  18 05:10


 


Pulse Ox  95   18 05:10














- Orders/Labs/Meds


Orders: 


 Active Orders 24 hr











 Category Date Time Status


 


 Sodium Chloride 0.9% [Normal Saline] 1,000 ml Med  18 04:00 Active





 IV ASDIRECTED   


 


 Sodium Chloride 0.9% [Saline Flush] Med  18 03:48 Active





 10 ml FLUSH ASDIRECTED PRN   


 


 Saline Lock Insert [OM.PC] Routine Oth  18 03:48 Ordered








 Medication Orders





Sodium Chloride (Normal Saline)  1,000 mls @ 500 mls/hr IV ASDIRECTED JULIANO


   Last Admin: 18 04:18  Dose: 500 mls/hr


Sodium Chloride (Saline Flush)  10 ml FLUSH ASDIRECTED PRN


   PRN Reason: Keep Vein Open


   Last Admin: 18 04:35  Dose: 10 ml


   Admin: 18 04:22  Dose: 10 ml








Meds: 


Medications











Generic Name Dose Route Start Last Admin





  Trade Name Freq  PRN Reason Stop Dose Admin


 


Sodium Chloride  1,000 mls @ 500 mls/hr  18 04:00  18 04:18





  Normal Saline  IV   500 mls/hr





  ASDIRECTED JULIANO   Administration





     





     





     





     


 


Sodium Chloride  10 ml  18 03:48  18 04:35





  Saline Flush  FLUSH   10 ml





  ASDIRECTED PRN   Administration





  Keep Vein Open   





     





     





     














Discontinued Medications














Generic Name Dose Route Start Last Admin





  Trade Name Freq  PRN Reason Stop Dose Admin


 


Ketamine HCl  50 mg  18 03:49  18 04:27





  Ketalar  IV  18 03:50  50 mg





  ASDIRECTED ONE   Administration





     





     





     





     


 


Methylprednisolone Sodium Succinate  125 mg  18 03:50  18 04:23





  Solu-Medrol  IVPUSH  18 03:51  125 mg





  ONETIME ONE   Administration





     





     





     





     


 


Ondansetron HCl  4 mg  18 04:05  18 04:18





  Zofran  IVPUSH  18 04:06  4 mg





  ONETIME ONE   Administration





     





     





     





     














- Re-Assessments/Exams


Free Text/Narrative Re-Assessment/Exam: 





18 04:43


pt was given ketamine which she has had pain relief in the past from. She was 

given 50mg iv with solumedrol 125. She had good pain relief. She did become 

quite sleepy. Will follow in the er until she becomes more awake. 


18 05:14


pt was given percocet 5/325 prior to leaving. 





Departure





- Departure


Time of Disposition: 05:15


Disposition: Home, Self-Care 01


Condition: Fair


Clinical Impression: 


 Trigeminal neuralgia








- Discharge Information


Referrals: 


Dexter Patel Sr, MD [Primary Care Provider] - 


Forms:  ED Department Discharge


Care Plan Goals: 


appt with Dr Patel regarding suppressive therapy for the trigeminal neuralgia

, percocet 5/325 q6h prn for pain. 





- My Orders


Last 24 Hours: 


My Active Orders





18 03:48


Sodium Chloride 0.9% [Saline Flush]   10 ml FLUSH ASDIRECTED PRN 


Saline Lock Insert [OM.PC] Routine 





18 04:00


Sodium Chloride 0.9% [Normal Saline] 1,000 ml IV ASDIRECTED 














- Assessment/Plan


Last 24 Hours: 


My Active Orders





18 03:48


Sodium Chloride 0.9% [Saline Flush]   10 ml FLUSH ASDIRECTED PRN 


Saline Lock Insert [OM.PC] Routine 





18 04:00


Sodium Chloride 0.9% [Normal Saline] 1,000 ml IV ASDIRECTED

## 2018-04-13 ENCOUNTER — HOSPITAL ENCOUNTER (EMERGENCY)
Dept: HOSPITAL 11 - JP.ED | Age: 58
Discharge: HOME | End: 2018-04-13
Payer: MEDICAID

## 2018-04-13 VITALS — DIASTOLIC BLOOD PRESSURE: 102 MMHG | SYSTOLIC BLOOD PRESSURE: 170 MMHG

## 2018-04-13 DIAGNOSIS — Z88.0: ICD-10-CM

## 2018-04-13 DIAGNOSIS — Z91.040: ICD-10-CM

## 2018-04-13 DIAGNOSIS — Z88.8: ICD-10-CM

## 2018-04-13 DIAGNOSIS — E78.00: ICD-10-CM

## 2018-04-13 DIAGNOSIS — G50.0: Primary | ICD-10-CM

## 2018-04-13 DIAGNOSIS — E03.9: ICD-10-CM

## 2018-04-13 PROCEDURE — 99284 EMERGENCY DEPT VISIT MOD MDM: CPT

## 2018-04-13 PROCEDURE — 80048 BASIC METABOLIC PNL TOTAL CA: CPT

## 2018-04-13 PROCEDURE — 84443 ASSAY THYROID STIM HORMONE: CPT

## 2018-04-13 PROCEDURE — 85651 RBC SED RATE NONAUTOMATED: CPT

## 2018-04-13 PROCEDURE — 36415 COLL VENOUS BLD VENIPUNCTURE: CPT

## 2018-04-13 PROCEDURE — 70450 CT HEAD/BRAIN W/O DYE: CPT

## 2018-04-13 PROCEDURE — 86140 C-REACTIVE PROTEIN: CPT

## 2018-04-13 PROCEDURE — 85025 COMPLETE CBC W/AUTO DIFF WBC: CPT

## 2018-04-13 PROCEDURE — 96374 THER/PROPH/DIAG INJ IV PUSH: CPT

## 2018-04-13 NOTE — EDM.PDOC
ED HPI GENERAL MEDICAL PROBLEM





- General


Chief Complaint: ENT Problem


Stated Complaint: L SIDE FACIAL/EAR PAIN


Time Seen by Provider: 18 18:00


Source of Information: Reports: Patient, Family


History Limitations: Reports: No Limitations





- History of Present Illness


INITIAL COMMENTS - FREE TEXT/NARRATIVE: 





57-year-old female with a history of left trigeminal neuralgia is seen for the 

third time in the last month and a half for left-sided facial pain. It is 

"different" this time with more pain in her ear but she also has a burning 

sensation on her tongue. She really hasn't had several days in a row where she'

s been asymptomatic for the past month and a half. No other neurologic symptoms 

peripherally, no speech problems, ambulation difficulties, weakness, or other 

concerns. She has not had a fever or chills, no nausea or vomiting.


Location: Reports: Head, Face


Severity: Moderate (Left side)


  ** 9


Pain Score (Numeric/FACES): 7





- Related Data


 Allergies











Allergy/AdvReac Type Severity Reaction Status Date / Time


 


latex Allergy  Numbness Verified 18 17:29


 


nitrofurantoin Allergy  Shortness Verified 18 17:29





[From Macrobid]   of Breath  


 


NSAIDS (Non-Steroidal Allergy  Rash Verified 18 17:29





Anti-Inflamma     


 


Penicillins Allergy  Rash Verified 18 17:29











Home Meds: 


 Home Meds





ALPRAZolam [Xanax] 1 mg PO BEDTIME 16 [History]


Omeprazole 40 mg PO BEDTIME 16 [History]


Sertraline [Zoloft] 200 mg PO BEDTIME 16 [History]


ARIPiprazole [Abilify] 5 mg PO BEDTIME 10/09/17 [History]


Prochlorperazine Maleate [Compazine] 10 mg PO Q8HR PRN 10/09/17 [History]











Past Medical History


HEENT History: Reports: Impaired Vision


Cardiovascular History: Reports: High Cholesterol


Respiratory History: Reports: Asthma


Gastrointestinal History: Reports: GERD


OB/GYN History: Reports: Pregnancy, Other (See Below)


Other OB/BYN History: bilateral breaset reduction


Musculoskeletal History: Reports: Arthritis


Neurological History: Reports: Concussion, Other (See Below)


Other Neuro History: Trijeminal neuralgia


Psychiatric History: Reports: Anxiety, Depression, Panic Attack, Psych 

Hospitalization(s), Suicide Attempt, Suicidal Ideation


Endocrine/Metabolic History: Reports: Hypothyroidism


Hematologic History: Reports: Anemia





- Infectious Disease History


Infectious Disease History: Reports: Chicken Pox, Mumps





- Past Surgical History


GI Surgical History: Reports: Cholecystectomy, Colonoscopy, EGD, Polypectomy


Female  Surgical History: Reports: Breast Reduction,  Section, 

Hysterectomy, Oophorectomy


Musculoskeletal Surgical History: Reports: Other (See Below)


Other Musculoskeletal Surgeries/Procedures:: connective tissue disease.  scope 

on bilateral knee for miniscus





Social & Family History





- Tobacco Use


Smoking Status *Q: Never Smoker


Second Hand Smoke Exposure: No





- Caffeine Use


Caffeine Use: Reports: Soda





- Recreational Drug Use


Recreational Drug Use: No





ED ROS GENERAL





- Review of Systems


Review Of Systems: See Below


Constitutional: Reports: Malaise.  Denies: Fever, Chills


HEENT: Reports: Ear Pain, Other (Burning-like sensation to the left side of her 

tongue)


Respiratory: Reports: No Symptoms


Cardiovascular: Reports: No Symptoms


GI/Abdominal: Reports: No Symptoms


: Reports: No Symptoms


Skin: Reports: No Symptoms.  Denies: Rash


Psychiatric: Reports: No Symptoms





ED EXAM, GENERAL





- Physical Exam


Exam: See Below


Exam Limited By: No Limitations


General Appearance: Alert


Eye Exam: Bilateral Eye: Normal Inspection


Ears: Normal TMs, Other (She has no pain with movement of the ear pinna on the 

left or palpation of the trigeminal nerve)


Ear Exam: Bilateral Ear: Auricle Normal, TM normal, Tenderness (No tenderness 

to palpation of either ear pinna or helix)


Throat/Mouth: Normal Inspection


Head: Atraumatic


Respiratory/Chest: No Respiratory Distress


Neurological: Alert, Oriented, No Motor/Sensory Deficits





Course





- Vital Signs


Last Recorded V/S: 


 Last Vital Signs











Temp  94.9 F L  18 17:35


 


Pulse  86   18 17:35


 


Resp  17   18 17:35


 


BP  170/102 H  18 17:35


 


Pulse Ox  98   18 17:35














- Orders/Labs/Meds


Orders: 


 Active Orders 24 hr











 Category Date Time Status


 


 Head wo Cont [CT] Stat Exams  18 18:39 Taken











Labs: 


 Laboratory Tests











  18 Range/Units





  18:16 18:16 


 


WBC  9.9   (4.5-11.0)  K/uL


 


RBC  4.11   (3.30-5.50)  M/uL


 


Hgb  11.1 L   (12.0-15.0)  g/dL


 


Hct  35.1 L   (36.0-48.0)  %


 


MCV  85   (80-98)  fL


 


MCH  27   (27-31)  pg


 


MCHC  32   (32-36)  %


 


Plt Count  297   (150-400)  K/uL


 


Neut % (Auto)  72 H   (36-66)  %


 


Lymph % (Auto)  24   (24-44)  %


 


Mono % (Auto)  4   (2-6)  %


 


Eos % (Auto)  1 L   (2-4)  %


 


Baso % (Auto)  0   (0-1)  %


 


ESR  46 H   (0-25)  mm/hr


 


Sodium   141  (140-148)  mmol/L


 


Potassium   4.0  (3.6-5.2)  mmol/L


 


Chloride   103  (100-108)  mmol/L


 


Carbon Dioxide   31  (21-32)  mmol/L


 


Anion Gap   7.2  (5.0-14.0)  mmol/L


 


BUN   10  (7-18)  mg/dL


 


Creatinine   0.9  (0.6-1.0)  mg/dL


 


Est Cr Clr Drug Dosing   58.42  mL/min


 


Estimated GFR (MDRD)   > 60  (>60)  


 


Glucose   105  ()  mg/dL


 


Calcium   8.9  (8.5-10.1)  mg/dL


 


C-Reactive Protein   1.06 H  (0.0-0.3)  mg/dL


 


TSH, Ultra Sensitive   0.820  (0.358-3.740)  uIU/mL











Meds: 


Medications














Discontinued Medications














Generic Name Dose Route Start Last Admin





  Trade Name Freq  PRN Reason Stop Dose Admin


 


Hydromorphone HCl  0.5 mg  18 18:04  18 18:23





  Dilaudid  IVPUSH  18 18:05  0.5 mg





  ONETIME ONE   Administration





     





     





     





     














- Re-Assessments/Exams


Free Text/Narrative Re-Assessment/Exam: 





18 00:07


CT of the head was done which was negative. Patient was given 0.5 mg of IV 

Dilaudid, CBC, BMP, TSH, sedimentation rate and CRP was obtained.


18 00:07


There was a mild increase in CRP and a moderate increase in sedimentation rate. 

These are possibly related to her chronic arthritic difficulties. CBC and CMP 

as well as TSH were normal. All copies of the labs were given to the patient 

and she will recheck with Dr. Patel on Monday, she may need an MRI of the 

head or or a neurologic consultation.





Departure





- Departure


Time of Disposition: 19:49


Disposition: Home, Self-Care 01


Condition: Good


Clinical Impression: 


 Trigeminal neuralgia of left side of face








- Discharge Information


Instructions:  Trigeminal Neuralgia


Referrals: 


Dexter Patel Sr, MD [Primary Care Provider] - 


Forms:  ED Department Discharge


Care Plan Goals: 


Rest this weekend and continue medications as prescribed as well as pain 

medication if needed. Recheck on Monday as scheduled.





- My Orders


Last 24 Hours: 


My Active Orders





18 18:39


Head wo Cont [CT] Stat 














- Assessment/Plan


Last 24 Hours: 


My Active Orders





18 18:39


Head wo Cont [CT] Stat

## 2021-02-24 NOTE — EDM.PDOC
ED HPI GENERAL MEDICAL PROBLEM





- General


Chief Complaint: General


Stated Complaint: JAW PAIN


Time Seen by Provider: 21 17:05


Source of Information: Reports: Patient, Family, Old Records, RN


History Limitations: Reports: No Limitations





- History of Present Illness


INITIAL COMMENTS - FREE TEXT/NARRATIVE: 





61 yo female has been having intermittent pain in the L side of her face like 

her prior trigeminal neuralgia. Sx's began on Monday. Has not reached out to her

primary care provider. Has not tolerated carbamazepine in the past. Asks for 

Ketamine as she thinks this works the best for her of the meds she's tried for 

this in the past. Her last visit here for this was about 2.5 yrs ago. 


Onset: Gradual


Onset Date: 21


Duration: Day(s): (~3), Intermittent, Waxing/Waning


Location: Reports: Face (L sided)


Quality: Reports: Sharp, Other (shooting)


Severity: Moderate


Improves with: Reports: None


Worsens with: Reports: Other (unknown)


Context: Reports: Other (See HPI)


Associated Symptoms: Reports: No Other Symptoms


Treatments PTA: Reports: Other (see below) (none)


  ** Left Face/Facial


Pain Score (Numeric/FACES): 8





- Related Data


                                    Allergies











Allergy/AdvReac Type Severity Reaction Status Date / Time


 


latex Allergy  Numbness Verified 18 17:29


 


nitrofurantoin Allergy  Shortness Verified 18 17:29





[From Macrobid]   of Breath  


 


NSAIDS (Non-Steroidal Allergy  Rash Verified 18 17:29





Anti-Inflamma     


 


Penicillins Allergy  Rash Verified 18 17:29











Home Meds: 


                                    Home Meds





ALPRAZolam [Xanax] 1 mg PO BEDTIME 16 [History]


Omeprazole 40 mg PO BEDTIME 16 [History]


Sertraline [Zoloft] 200 mg PO BEDTIME 16 [History]


ARIPiprazole [Abilify] 5 mg PO BEDTIME 10/09/17 [History]


Prochlorperazine Maleate [Compazine] 10 mg PO Q8HR PRN 10/09/17 [History]











Past Medical History


HEENT History: Reports: Impaired Vision


Cardiovascular History: Reports: High Cholesterol


Respiratory History: Reports: Asthma


Gastrointestinal History: Reports: GERD


OB/GYN History: Reports: Pregnancy, Other (See Below)


Other OB/GYN History: bilateral breaset reduction


Musculoskeletal History: Reports: Arthritis


Neurological History: Reports: Concussion, Other (See Below)


Other Neuro History: Trijeminal neuralgia


Psychiatric History: Reports: Anxiety, Depression, Panic Attack, Psych 

Hospitalization(s), Suicide Attempt, Suicidal Ideation


Endocrine/Metabolic History: Reports: Hypothyroidism


Hematologic History: Reports: Anemia





- Infectious Disease History


Infectious Disease History: Reports: Chicken Pox, Mumps





- Past Surgical History


GI Surgical History: Reports: Cholecystectomy, Colonoscopy, EGD, Polypectomy


Female  Surgical History: Reports: Breast Reduction,  Section, 

Hysterectomy, Oophorectomy


Musculoskeletal Surgical History: Reports: Other (See Below)


Other Musculoskeletal Surgeries/Procedures:: connective tissue disease.  scope 

on bilateral knee for miniscus





Social & Family History





- Tobacco Use


Tobacco Use Status *Q: Never Tobacco User





- Caffeine Use


Caffeine Use: Reports: Soda





- Recreational Drug Use


Recreational Drug Use: No





ED ROS GENERAL





- Review of Systems


Review Of Systems: See Below


Constitutional: Reports: No Symptoms


HEENT: Reports: Other (L facial pain)


GI/Abdominal: Denies: Nausea


Musculoskeletal: Reports: No Symptoms


Skin: Reports: No Symptoms


Neurological: Reports: Other (shooting pain in her L cheek area that starts in 

front of the ear. )


Psychiatric: Reports: No Symptoms





ED EXAM, GENERAL





- Physical Exam


Exam: See Below


Exam Limited By: No Limitations


General Appearance: Alert, WD/WN, No Apparent Distress


Eye Exam: Bilateral Eye: EOMI, Normal Inspection


Ears: Normal External Exam, Normal Canal, Hearing Grossly Normal, Normal TMs


Ear Exam: Bilateral Ear: Auricle Normal, Canal Normal, TM normal


Nose: Normal Inspection, No Blood


Throat/Mouth: Normal Inspection, Normal Lips, Normal Oropharynx, Normal Voice, 

No Airway Compromise


Head: Atraumatic, Normocephalic


Neck: Normal Inspection


Respiratory/Chest: No Respiratory Distress, Lungs Clear, Normal Breath Sounds, 

No Accessory Muscle Use


Cardiovascular: Regular Rate, Rhythm


Extremities: Normal Inspection


Neurological: Alert, Oriented, CN II-XII Intact, Normal Cognition, No 

Motor/Sensory Deficits


Psychiatric: Normal Affect, Normal Mood


Skin Exam: Warm, Dry, Intact, Normal Color, No Rash





Course





- Vital Signs


Last Recorded V/S: 


                                Last Vital Signs











Temp  36.4 C   21 17:06


 


Pulse  73   21 17:06


 


Resp  16   21 17:06


 


BP  174/85 H  21 17:06


 


Pulse Ox  97   21 17:06














- Orders/Labs/Meds


Orders: 


                               Active Orders 24 hr











 Category Date Time Status


 


 Sodium Chloride 0.9% [Saline Flush] Med  21 17:15 Active





 10 ml FLUSH ASDIRECTED PRN   


 


 Saline Lock Insert [OM.PC] Routine Oth  21 17:15 Ordered


 


 EKG 12 Lead [EK] Routine Ther  21 16:54 Stop Req








                                Medication Orders





Sodium Chloride (Saline Flush)  10 ml FLUSH ASDIRECTED PRN


   PRN Reason: Keep Vein Open


   Last Admin: 21 17:25  Dose: 10 ml


   Documented by: THURDEA








Meds: 


Medications











Generic Name Dose Route Start Last Admin





  Trade Name Freq  PRN Reason Stop Dose Admin


 


Sodium Chloride  10 ml  21 17:15  21 17:25





  Saline Flush  FLUSH   10 ml





  ASDIRECTED PRN   Administration





  Keep Vein Open  














Discontinued Medications














Generic Name Dose Route Start Last Admin





  Trade Name Freq  PRN Reason Stop Dose Admin


 


Ketamine HCl  50 mg  21 17:15  21 17:25





  Ketalar  IV  21 17:16  50 mg





  ONETIME ONE   Administration


 


Methylprednisolone Sodium Succinate  125 mg  21 17:15  21 17:24





  Solu-Medrol  IVPUSH  21 17:16  125 mg





  ONETIME ONE   Administration


 


Ondansetron HCl  4 mg  21 17:15  21 17:22





  Zofran  IVPUSH  21 17:16  4 mg





  ONETIME ONE   Administration














- Re-Assessments/Exams


Free Text/Narrative Re-Assessment/Exam: 





21 18:03





Feels better after her tx. Feels ready for d/c. 





Departure





- Departure


Time of Disposition: 18:04


Disposition: Home, Self-Care 01


Condition: Good


Clinical Impression: 


 Trigeminal neuralgia of left side of face








- Discharge Information


*PRESCRIPTION DRUG MONITORING PROGRAM REVIEWED*: No


*COPY OF PRESCRIPTION DRUG MONITORING REPORT IN PATIENT ARISTEO: No


Referrals: 


Alee Álvarez MD [Primary Care Provider] - 


Forms:  ED Department Discharge


Additional Instructions: 


F/U with your provider as needed. No driving tonight. 





Sepsis Event Note (ED)





- Evaluation


Sepsis Screening Result: No Definite Risk





- Focused Exam


Vital Signs: 


                                   Vital Signs











  Temp Pulse Resp BP Pulse Ox


 


 21 17:06  36.4 C  73  16  174/85 H  97


 


 21 16:55  36.4 C  73  16  174/85 H  97














- My Orders


Last 24 Hours: 


My Active Orders





21 16:54


EKG 12 Lead [EK] Routine 





21 17:15


Sodium Chloride 0.9% [Saline Flush]   10 ml FLUSH ASDIRECTED PRN 


Saline Lock Insert [OM.PC] Routine 














- Assessment/Plan


Last 24 Hours: 


My Active Orders





21 16:54


EKG 12 Lead [EK] Routine 





21 17:15


Sodium Chloride 0.9% [Saline Flush]   10 ml FLUSH ASDIRECTED PRN 


Saline Lock Insert [OM.PC] Routine

## 2021-12-18 NOTE — EDM.PDOC
ED HPI GENERAL MEDICAL PROBLEM





- General


Chief Complaint: General


Stated Complaint: BROKEN TOOTH/ PAIN


Time Seen by Provider: 21 19:25


Source of Information: Reports: Patient


History Limitations: Reports: No Limitations





- History of Present Illness


INITIAL COMMENTS - FREE TEXT/NARRATIVE: 


Edda is a 61-year-old female presenting to the ED with concerns of infected 

tooth #30.  The patient has had a previous root canal and crown on this tooth, 

however, the crown has subsequently fallen off in the tooth is in poor repair.  

Basically, the tooth appears to be dead with severe discoloration and not much 

root remaining.  The patient has been unable to get into her dentist until mid 

January and actually had to take of the file to file down the sharp edges of the

tooth so she did not continue to scrape the buccal mucosa.  In addition, she has

had increased pain and swelling of the jaw.  She denies any fever, chills, or 

difficulty with swallowing.





  ** Right Lower Tooth/Teeth


Pain Score (Numeric/FACES): 8





- Related Data


                                    Allergies











Allergy/AdvReac Type Severity Reaction Status Date / Time


 


latex Allergy  Numbness Verified 21 19:04


 


nitrofurantoin Allergy  Shortness Verified 21 19:04





[From Macrobid]   of Breath  


 


NSAIDS (Non-Steroidal Allergy  Rash Verified 21 19:04





Anti-Inflamma     


 


Penicillins Allergy  Rash Verified 21 19:04











Home Meds: 


                                    Home Meds





Omeprazole 40 mg PO BEDTIME 16 [History]


Prochlorperazine Maleate [Compazine] 10 mg PO Q8HR PRN 10/09/17 [History]


ALPRAZolam [Alprazolam] 0.5 mg PO DAILY PRN 21 [History]


Gabapentin [Neurontin] 300 mg PO BEDTIME PRN 21 [History]


Venlafaxine HCl [Venlafaxine ER] 150 mg PO BEDTIME 21 [History]


atenoloL [Atenolol] 25 mg PO ASDIRECTED PRN 21 [History]


atorvaSTATin Calcium [Atorvastatin Calcium] 20 mg PO BEDTIME 21 [History]











Past Medical History


HEENT History: Reports: Cataract, Impaired Vision


Cardiovascular History: Reports: Arrhythmia, High Cholesterol


Respiratory History: Reports: Asthma


Gastrointestinal History: Reports: GERD


OB/GYN History: Reports: Pregnancy, Other (See Below)


Other OB/GYN History: bilateral breaset reduction


Musculoskeletal History: Reports: Arthritis


Neurological History: Reports: Concussion, Other (See Below)


Other Neuro History: Trijeminal neuralgia


Psychiatric History: Reports: Anxiety, Depression, Panic Attack, Psych 

Hospitalization(s), Suicide Attempt, Suicidal Ideation


Endocrine/Metabolic History: Reports: Hypothyroidism


Hematologic History: Reports: Anemia, B12 Deficiency, Iron Deficiency





- Infectious Disease History


Infectious Disease History: Reports: Chicken Pox, Herpes, Measles, Mumps, Novel 

Coronavirus





- Past Surgical History


HEENT Surgical History: Reports: Cataract Surgery


GI Surgical History: Reports: Cholecystectomy, Colonoscopy, EGD, Polypectomy


Female  Surgical History: Reports: Breast Reduction,  Section, 

Hysterectomy, Oophorectomy


Musculoskeletal Surgical History: Reports: Knee Replacement, Other (See Below)


Other Musculoskeletal Surgeries/Procedures:: connective tissue disease.  scope 

on bilateral knee for miniscus





Social & Family History





- Tobacco Use


Tobacco Use Status *Q: Never Tobacco User





- Caffeine Use


Caffeine Use: Reports: Soda





ED ROS GENERAL





- Review of Systems


Review Of Systems: See Below


Constitutional: Reports: No Symptoms


HEENT: Reports: Dental Pain (Involving tooth #30 which is missing this crown and

 has an open fracture.  There is a previous root canal done on this tooth.)





ED EXAM, GENERAL





- Physical Exam


Exam: See Below


Exam Limited By: No Limitations


General Appearance: Alert, Mild Distress


Throat/Mouth: Other (Open fracture on tooth #30 with gingival swelling and pain 

at the base of the tooth consistent with a apical abscess.  The tooth is 

severely discolored suggesting that it is a dead.)





Course





- Vital Signs


Last Recorded V/S: 





                                Last Vital Signs











Temp  36.1 C   21 19:13


 


Pulse  72   21 19:13


 


Resp  16   21 19:13


 


BP  134/79   21 19:13


 


Pulse Ox  100   21 19:13














- Re-Assessments/Exams


Free Text/Narrative Re-Assessment/Exam: 





21 19:39 has an open fracture that is chronic involving tooth #30 which 

now appears to have developed an apical abscess.  We will start her on 

clindamycin 300 mg 3 times daily for 10 days and I will give her a small amount 

of Norco for pain control.  I have put through a referral for the [facility] because the patient's not able to get into her dentist until 

mid January to address this issue.  Patient is in agreement with this plan and 

is suitable for discharge in satisfactory condition.








Departure





- Departure


Time of Disposition: 19:35


Disposition: Home, Self-Care 01


Clinical Impression: 


 Dental abscess





Open fracture of tooth


Qualifiers:


 Encounter type: initial encounter Qualified Code(s): S02.5XXB - Fracture of 

tooth (traumatic), initial encounter for open fracture








- Discharge Information


Instructions:  Dental Abscess, Easy-to-Read, Tooth Injuries, Easy-to-Read


Referrals: 


Alee Álvarez MD [Primary Care Provider] - 


Care Plan Goals: 


I have put through a referral for you to be seen at the [facility] on [address] for Monday.  Please show up at [phone] to get into the 

queue.  In the meantime we are going to start you on clindamycin 300 mg 3 times 

a day for the infection and hydrocodone for moderate to severe pain.  Have a Mariza

ry [place] and good luck with taking care of this tooth.





Sepsis Event Note (ED)





- Evaluation


Sepsis Screening Result: No Definite Risk





- Focused Exam


Vital Signs: 





                                   Vital Signs











  Temp Pulse Resp BP Pulse Ox


 


 21 19:13  36.1 C  72  16  134/79  100


 


 21 18:38  36.1 C  72  16  134/79  100














- Problem List & Annotations


(1) Dental abscess


SNOMED Code(s): 240467890


   Code(s): K04.7 - PERIAPICAL ABSCESS WITHOUT SINUS   Status: Acute   Priority:

Low   Current Visit: Yes   





(2) Open fracture of tooth


SNOMED Code(s): 24528734522904085


   Code(s): S02.5XXB - FRACTURE OF TOOTH (TRAUMATIC), INIT ENCNTR FOR OPEN 

FRACTURE   Status: Acute   Priority: Low   Current Visit: Yes   


Qualifiers: 


   Encounter type: initial encounter   Qualified Code(s): S02.5XXB - Fracture of

tooth (traumatic), initial encounter for open fracture   





- Problem List Review


Problem List Initiated/Reviewed/Updated: Yes